# Patient Record
Sex: FEMALE | Race: WHITE | NOT HISPANIC OR LATINO | Employment: STUDENT | URBAN - METROPOLITAN AREA
[De-identification: names, ages, dates, MRNs, and addresses within clinical notes are randomized per-mention and may not be internally consistent; named-entity substitution may affect disease eponyms.]

---

## 2023-09-05 ENCOUNTER — INITIAL PRENATAL (OUTPATIENT)
Dept: OBSTETRICS AND GYNECOLOGY | Facility: CLINIC | Age: 18
End: 2023-09-05
Payer: COMMERCIAL

## 2023-09-05 ENCOUNTER — LAB (OUTPATIENT)
Dept: LAB | Facility: HOSPITAL | Age: 18
End: 2023-09-05
Payer: COMMERCIAL

## 2023-09-05 VITALS
SYSTOLIC BLOOD PRESSURE: 120 MMHG | HEIGHT: 66 IN | BODY MASS INDEX: 23.63 KG/M2 | WEIGHT: 147 LBS | DIASTOLIC BLOOD PRESSURE: 66 MMHG

## 2023-09-05 DIAGNOSIS — Z3A.16 16 WEEKS GESTATION OF PREGNANCY: ICD-10-CM

## 2023-09-05 DIAGNOSIS — Z34.90 ENCOUNTER FOR SUPERVISION OF LOW-RISK PREGNANCY, ANTEPARTUM: ICD-10-CM

## 2023-09-05 DIAGNOSIS — O09.30 LATE PRENATAL CARE: Primary | ICD-10-CM

## 2023-09-05 LAB
ABO GROUP BLD: NORMAL
AMPHET+METHAMPHET UR QL: NEGATIVE
AMPHETAMINES UR QL: NEGATIVE
BARBITURATES UR QL SCN: NEGATIVE
BASOPHILS # BLD AUTO: 0.03 10*3/MM3 (ref 0–0.2)
BASOPHILS NFR BLD AUTO: 0.3 % (ref 0–1.5)
BENZODIAZ UR QL SCN: NEGATIVE
BLD GP AB SCN SERPL QL: NEGATIVE
BUPRENORPHINE SERPL-MCNC: NEGATIVE NG/ML
CANNABINOIDS SERPL QL: NEGATIVE
COCAINE UR QL: NEGATIVE
DEPRECATED RDW RBC AUTO: 39.6 FL (ref 37–54)
EOSINOPHIL # BLD AUTO: 0.07 10*3/MM3 (ref 0–0.4)
EOSINOPHIL NFR BLD AUTO: 0.6 % (ref 0.3–6.2)
ERYTHROCYTE [DISTWIDTH] IN BLOOD BY AUTOMATED COUNT: 13 % (ref 12.3–15.4)
FENTANYL UR-MCNC: NEGATIVE NG/ML
HBA1C MFR BLD: 4.9 % (ref 4.8–5.6)
HBV SURFACE AG SERPL QL IA: NORMAL
HCT VFR BLD AUTO: 38.9 % (ref 34–46.6)
HCV AB SER DONR QL: NORMAL
HGB BLD-MCNC: 13.4 G/DL (ref 12–15.9)
HIV 1+2 AB+HIV1 P24 AG SERPL QL IA: NORMAL
IMM GRANULOCYTES # BLD AUTO: 0.06 10*3/MM3 (ref 0–0.05)
IMM GRANULOCYTES NFR BLD AUTO: 0.5 % (ref 0–0.5)
LYMPHOCYTES # BLD AUTO: 1.66 10*3/MM3 (ref 0.7–3.1)
LYMPHOCYTES NFR BLD AUTO: 14.2 % (ref 19.6–45.3)
MCH RBC QN AUTO: 29.3 PG (ref 26.6–33)
MCHC RBC AUTO-ENTMCNC: 34.4 G/DL (ref 31.5–35.7)
MCV RBC AUTO: 85.1 FL (ref 79–97)
METHADONE UR QL SCN: NEGATIVE
MONOCYTES # BLD AUTO: 0.57 10*3/MM3 (ref 0.1–0.9)
MONOCYTES NFR BLD AUTO: 4.9 % (ref 5–12)
NEUTROPHILS NFR BLD AUTO: 79.5 % (ref 42.7–76)
NEUTROPHILS NFR BLD AUTO: 9.29 10*3/MM3 (ref 1.7–7)
NRBC BLD AUTO-RTO: 0 /100 WBC (ref 0–0.2)
OPIATES UR QL: NEGATIVE
OXYCODONE UR QL SCN: NEGATIVE
PCP UR QL SCN: NEGATIVE
PLATELET # BLD AUTO: 372 10*3/MM3 (ref 140–450)
PMV BLD AUTO: 11.8 FL (ref 6–12)
PROPOXYPH UR QL: NEGATIVE
RBC # BLD AUTO: 4.57 10*6/MM3 (ref 3.77–5.28)
RH BLD: POSITIVE
TRICYCLICS UR QL SCN: NEGATIVE
WBC NRBC COR # BLD: 11.68 10*3/MM3 (ref 3.4–10.8)

## 2023-09-05 PROCEDURE — 83036 HEMOGLOBIN GLYCOSYLATED A1C: CPT

## 2023-09-05 PROCEDURE — 81003 URINALYSIS AUTO W/O SCOPE: CPT | Performed by: OBSTETRICS & GYNECOLOGY

## 2023-09-05 PROCEDURE — 80081 OBSTETRIC PANEL INC HIV TSTG: CPT

## 2023-09-05 PROCEDURE — 86803 HEPATITIS C AB TEST: CPT

## 2023-09-05 PROCEDURE — 80307 DRUG TEST PRSMV CHEM ANLYZR: CPT | Performed by: OBSTETRICS & GYNECOLOGY

## 2023-09-05 PROCEDURE — 36415 COLL VENOUS BLD VENIPUNCTURE: CPT

## 2023-09-05 RX ORDER — PRENATAL VIT/IRON FUM/FOLIC AC 27MG-0.8MG
1 TABLET ORAL DAILY
Qty: 30 TABLET | Refills: 5 | Status: SHIPPED | OUTPATIENT
Start: 2023-09-05

## 2023-09-05 NOTE — PROGRESS NOTES
Subjective     Chief Complaint   Patient presents with    Initial Prenatal Visit     NOB c/o none       Caprice Zurita is a 18 y.o. year old .  Patient's last menstrual period was 2023..  She presents to be seen to initiate prenatal care with our practice.    She is currently having problems with none  As it relates to the pregnancy, she has concerns regarding routine prenatal care. Late entry.    The following portions of the patient's history were reviewed and updated as appropriate:vital signs, allergies, current medications, past medical history, past social history, past surgical history, and problem list.    Review of Systems - 14 point reviewed and negative    Objective     Physical Exam    General:  well developed; well nourished  no acute distress   Thyroid: normal to inspection and palpation   Breasts:  Not performed.   Abdomen: soft, non-tender; no masses  no umbilical or inguinal hernias are present  no hepato-splenomegaly  Gravid uterus   Pelvis: Clinical staff was present for exam  External genitalia:  normal appearance of the external genitalia including Bartholin's and Tonasket's glands.  :  urethral meatus normal;  Vaginal:  normal pink mucosa without prolapse or lesions. discharge present -  watery, white, and cultures done;  Cervix:  normal appearance.  Uterus:  symmetrically enlarged, consisent with 16 weeks size;  Adnexa:  normal bimanual exam of the adnexa.     Lab Review   No data reviewed    Imaging   Pelvic ultrasound images independantly reviewed - CWD    Assessment & Plan     ASSESSMENT  IUP at 16 weeks 5 days  Late entry to prenatal care    PLAN  Tests ordered today:  Orders Placed This Encounter   Procedures    Gardnerella vaginalis, Trichomonas vaginalis, Candida albicans, DNA - Swab, Vagina     Standing Status:   Future     Standing Expiration Date:   2024     Order Specific Question:   Release to patient     Answer:   Routine Release [[id number]]    [facility]     Standing Status:   Future     Standing Expiration Date:   9/4/2024     Order Specific Question:   Reason for Exam:     Answer:   screen     Order Specific Question:   Release to patient     Answer:   Routine Release [[id number]]    Urinalysis With Culture If Indicated - Urine, Clean Catch     Standing Status:   Future     Standing Expiration Date:   8/30/2024     Order Specific Question:   Release to patient     Answer:   Routine Release [[id number]]    Urine Drug Screen - Urine, Clean Catch     Standing Status:   Future     Standing Expiration Date:   8/30/2024     Order Specific Question:   Release to patient     Answer:   Routine Release [[id number]]    HIV-1 / O / 2 Ag / Antibody     Standing Status:   Future     Standing Expiration Date:   8/29/2024     Order Specific Question:   Release to patient     Answer:   Routine Release [[id number]]    Obstetric Panel     Standing Status:   Future     Standing Expiration Date:   8/29/2024     Order Specific Question:   Release to patient     Answer:   Routine Release [[id number]]    Hemoglobin A1c     Standing Status:   Future     Standing Expiration Date:   8/29/2024     Order Specific Question:   Release to patient     Answer:   Routine Release [[id number]]    [facility] Prenatal Test: Chromosomes 13, 18, 21, X & Y: Triploidy 22Q.11.2 Deletion - Blood,     ==========Department Information==========    ID: [id number]    Department:[facility]  [address] [place] [address] [address] [place] [id number]  Dept: [phone]  Dept Fax: [phone]  Loc: [phone]  Loc Fax: [phone]       Standing Status:   Future     Standing Expiration Date:   9/5/2024     Order Specific Question:   Is patient pregnant?     Answer:   Yes     Order Specific Question:   Expected due date (MM/DD/YYYY):     Answer:   2/15/2024     Order Specific Question:   Is this a twin pregnancy? (viable, no vanished twin)      Answer:   No     Order Specific Question:   Is this a surrogate or egg donor pregnancy?     Answer:   No     Order Specific Question:   I want fetal sex included in the report:     Answer:   Yes     Order Specific Question:   Patient's weight (lbs):     Answer:   147     Order Specific Question:   Opt out of 22q11.2?     Answer:   No     Order Specific Question:   Enroll this patient in the Automatic Redraw Program?     Answer:   Yes     Order Specific Question:   What type of billing?     Answer:   Bill Insurance     Order Specific Question:   Did patient sign the Patient Acknowledgement?     Answer:   Yes     Order Specific Question:   Did the ordering clinician sign the Statement of Informed Consent?     Answer:   Yes     Order Specific Question:   Blood draw managed by Kevin?     Answer:   No     Order Specific Question:   Release to patient     Answer:   Routine Release [[id number]]    Alpha Fetoprotein, Maternal     Maternal weight 147 lbs     Standing Status:   Future     Standing Expiration Date:   9/5/2024     Order Specific Question:   [facility] Date of last menstrual period or estimated date of delivery (corresponding to calculation method):     Answer:   2/15/2024     Order Specific Question:   [facility] Gestational age calculation method:     Answer:   LMP     Order Specific Question:   Release to patient     Answer:   Routine Release [[id number]]     Medications prescribed today:  New Medications Ordered This Visit   Medications    Prenatal Vit-Fe Fumarate-FA (prenatal vitamin 27-0.8) 27-0.8 MG tablet tablet     Sig: Take 1 tablet by mouth Daily.     Dispense:  30 tablet     Refill:  5     May substitute with any prenatal vitamin covered by her insurance     Information reviewed: exercise in pregnancy, nutrition in pregnancy, weight gain in pregnancy, work and travel restrictions during pregnancy, list of OTC medications acceptable in pregnancy, and call coverage groups  Genetic testing reviewed: [facility]  and NIPT ([facility])  The problem list for pregnancy was initiated today  MFM scan on retrun      Follow up: 4 week(s)       In addition to interpreting and discussing today's ultrasound I spent an additional 45 minutes caring for Caprice on this date of service. This time includes time spent by me in the following activities: obtaining and/or reviewing a separately obtained history, performing a medically appropriate examination and/or evaluation, counseling and educating the patient/family/caregiver, ordering medications, tests, or procedures, referring and communicating with other health care professionals, documenting information in the medical record, and care coordination.     This note was electronically signed.    Meño Carpio MD  September 5, 2023

## 2023-09-06 LAB
BILIRUB UR QL STRIP: NEGATIVE
CLARITY UR: CLEAR
COLOR UR: YELLOW
GLUCOSE UR STRIP-MCNC: NEGATIVE MG/DL
HGB UR QL STRIP.AUTO: NEGATIVE
KETONES UR QL STRIP: NEGATIVE
LEUKOCYTE ESTERASE UR QL STRIP.AUTO: NEGATIVE
NITRITE UR QL STRIP: NEGATIVE
PH UR STRIP.AUTO: 6.5 [PH] (ref 5–8)
PROT UR QL STRIP: NEGATIVE
RPR SER QL: NORMAL
SP GR UR STRIP: 1.01 (ref 1–1.03)
UROBILINOGEN UR QL STRIP: NORMAL

## 2023-09-07 LAB — RUBV IGG SERPL IA-ACNC: <0.9 INDEX

## 2023-09-08 ENCOUNTER — PATIENT ROUNDING (BHMG ONLY) (OUTPATIENT)
Dept: OBSTETRICS AND GYNECOLOGY | Facility: CLINIC | Age: 18
End: 2023-09-08
Payer: COMMERCIAL

## 2023-09-08 NOTE — PROGRESS NOTES
A [employer] message has been sent to the patient for PATIENT ROUNDING with [facility].

## 2023-09-10 LAB
Lab: NORMAL
NTRA 22Q11.2 DELETION SYNDROME POPULATION-BASED RISK TEXT: NORMAL
NTRA 22Q11.2 DELETION SYNDROME RESULT TEXT: NORMAL
NTRA 22Q11.2 DELETION SYNDROME RISK SCORE TEXT: NORMAL
NTRA FETAL FRACTION: NORMAL
NTRA GENDER OF FETUS: NORMAL
NTRA MONOSOMY X AGE-BASED RISK TEXT: NORMAL
NTRA MONOSOMY X RESULT TEXT: NORMAL
NTRA MONOSOMY X RISK SCORE TEXT: NORMAL
NTRA TRIPLOIDY RESULT TEXT: NORMAL
NTRA TRISOMY 13 AGE-BASED RISK TEXT: NORMAL
NTRA TRISOMY 13 RESULT TEXT: NORMAL
NTRA TRISOMY 13 RISK SCORE TEXT: NORMAL
NTRA TRISOMY 18 AGE-BASED RISK TEXT: NORMAL
NTRA TRISOMY 18 RESULT TEXT: NORMAL
NTRA TRISOMY 18 RISK SCORE TEXT: NORMAL
NTRA TRISOMY 21 AGE-BASED RISK TEXT: NORMAL
NTRA TRISOMY 21 RESULT TEXT: NORMAL
NTRA TRISOMY 21 RISK SCORE TEXT: NORMAL